# Patient Record
Sex: MALE | Race: OTHER | HISPANIC OR LATINO | URBAN - METROPOLITAN AREA
[De-identification: names, ages, dates, MRNs, and addresses within clinical notes are randomized per-mention and may not be internally consistent; named-entity substitution may affect disease eponyms.]

---

## 2022-01-02 ENCOUNTER — EMERGENCY (EMERGENCY)
Facility: HOSPITAL | Age: 57
LOS: 1 days | Discharge: HOME | End: 2022-01-02
Attending: EMERGENCY MEDICINE
Payer: MEDICAID

## 2022-01-02 VITALS
HEIGHT: 68 IN | RESPIRATION RATE: 16 BRPM | SYSTOLIC BLOOD PRESSURE: 147 MMHG | OXYGEN SATURATION: 98 % | TEMPERATURE: 99 F | DIASTOLIC BLOOD PRESSURE: 85 MMHG | WEIGHT: 160.06 LBS | HEART RATE: 88 BPM

## 2022-01-02 PROCEDURE — 73562 X-RAY EXAM OF KNEE 3: CPT | Mod: 26,LT

## 2022-01-02 PROCEDURE — 99283 EMERGENCY DEPT VISIT LOW MDM: CPT

## 2022-01-02 PROCEDURE — 99283 EMERGENCY DEPT VISIT LOW MDM: CPT | Mod: 25

## 2022-01-02 PROCEDURE — 73562 X-RAY EXAM OF KNEE 3: CPT
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
## 2022-01-02 NOTE — ED PROVIDER NOTE - PROGRESS NOTE DETAILS
Patient with patella boris.   States that this is new, concern for tibial tendon rupture.  Patient told to see ortho tomorrow and told if unable to obtain orthopedist appointment tomorrow (1/3/2022) come back to the Emergency department.   Patient agreeable, patient placed in knee immobilizer and crutches
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
## 2022-01-02 NOTE — ED PROVIDER NOTE - NSFOLLOWUPCLINICS_GEN_ALL_ED_FT
[facility]  Orthopedics  [address] [place] [address]  Phone: [phone]  Fax: [phone]

## 2022-01-02 NOTE — ED ADULT NURSE NOTE - NSIMPLEMENTINTERV_GEN_ALL_ED
Implemented All Universal Safety Interventions:  [place] to call system. Call bell, personal items and telephone within reach. Instruct patient to call for assistance. Room bathroom lighting operational. Non-slip footwear when patient is off stretcher. Physically safe environment: no spills, clutter or unnecessary equipment. Stretcher in lowest position, wheels locked, appropriate side rails in place.

## 2022-01-02 NOTE — ED PROVIDER NOTE - PHYSICAL EXAMINATION
GEN:   comfortable, in no apparent distress, AOx3  EYES:   PERRL, extra-occular movements intact  RESP:   clear to auscultation bilaterally, non-labored, speaking in full sentences  ABD:   soft, non tender, no guarding  :   no cva tenderness  MSK:   5/5 strength, moving all extremities.  no midline spinal TTP, step offs or deformities.  L knee ttp along inferior medial aspect.  No crepitus.  5/5 strength upon flexion and extension, no neurovascular deficits.   SKIN:   dry, intact, no rash  NEURO:   AOx3, no focal weakness or loss of sensation, GCS 15.   PSYCH: calm, cooperative, no apparent risk to self and others GEN:   comfortable, in no apparent distress, AOx3  EYES:   PERRL, extra-occular movements intact  RESP:   clear to auscultation bilaterally, non-labored, speaking in full sentences  ABD:   soft, non tender, no guarding  :   no cva tenderness  MSK:  except left knee 5/5 strength, moving all extremities.  no midline spinal TTP, step offs or deformities.  L knee ttp along inferior medial aspect.  No crepitus.  5/5 strength upon flexion, unable to extend left knee.  Patella boris on exam. no neurovascular deficits.   SKIN:   dry, intact, no rash  NEURO:   AOx3, no focal weakness or loss of sensation, GCS 15.   PSYCH: calm, cooperative, no apparent risk to self and others

## 2022-01-02 NOTE — ED PROVIDER NOTE - NSFOLLOWUPINSTRUCTIONS_ED_ALL_ED_FT
If unable to obtain orthopedist appointment tomorrow (1/3/2022) come back to the Emergency department.

## 2022-01-03 ENCOUNTER — EMERGENCY (EMERGENCY)
Facility: HOSPITAL | Age: 57
LOS: 1 days | Discharge: HOME | End: 2022-01-03
Attending: EMERGENCY MEDICINE
Payer: MEDICAID

## 2022-01-03 VITALS
WEIGHT: 160.06 LBS | OXYGEN SATURATION: 99 % | TEMPERATURE: 98 F | RESPIRATION RATE: 16 BRPM | HEIGHT: 68 IN | SYSTOLIC BLOOD PRESSURE: 125 MMHG | DIASTOLIC BLOOD PRESSURE: 71 MMHG | HEART RATE: 79 BPM

## 2022-01-03 PROCEDURE — 99284 EMERGENCY DEPT VISIT MOD MDM: CPT

## 2022-01-03 NOTE — ED PROVIDER NOTE - ATTENDING CONTRIBUTION TO CARE
55 y/o male presents w/ likely left-sided tendon rupture. Plan: ortho consult and possible admission.
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
## 2022-01-03 NOTE — ED PROVIDER NOTE - NSFOLLOWUPINSTRUCTIONS_ED_ALL_ED_FT
Follow up with Dr Phillips this week without fail.    For pain you can take over the counter Ibuprofen 600 mg orally every 6 hours as needed for pain. Take medication with food.     If you experience any new or worsening symptoms or if you are concerned you can always come back to the emergency for a re-evaluation.

## 2022-01-03 NOTE — ED PROVIDER NOTE - CARE PROVIDER_API CALL
Guru Phillips)  Orthopaedic Surgery; Sports Medicine  [address] [place] [address], [address] [place] [address]  Phone: [phone]  Fax: [phone]  Follow Up Time:
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
## 2022-01-03 NOTE — ED PROVIDER NOTE - PATIENT PORTAL LINK FT
You can access the FollowMyHealth Patient Portal offered by [facility] by registering at the following website: [email or web address]. By joining [employer]’s FollowMyHealth portal, you will also be able to view your health information using other applications (apps) compatible with our system.

## 2022-01-03 NOTE — ED PROVIDER NOTE - PHYSICAL EXAMINATION
Musculoskeletal: swelling w/ deformity and patella boris felt, unable to do straight leg raise or extension of knee, able to dorsiflex and plantarflex the foot, no sensory deficits, distal pulses intact 2+ Musculoskeletal: swelling w/ deformity and patella boris felt, unable to do straight leg raise or extension of knee, able to dorsiflex and plantarflex the foot, no sensory deficits, distal pulses intact 2+.

## 2023-07-25 NOTE — ED PROVIDER NOTE - PROGRESS NOTE DETAILS
fall Ortho consulted. Dr Phillips wants to see patient in office this week for surgery planning. At first MRI requested to be done in the ER but then ortho team decided not to do it today. Knee immobilizer re-applied and will dc with ortho follow up this week. Pt is well appearing, stable for discharge and follow up without fail with medical doctor. I had a detailed discussion with the patient and/or guardian regarding the historical points, exam findings, and any diagnostic results supporting the discharge diagnosis. Pt educated on care and need for follow up. Strict return instructions and red flag signs and symptoms discussed with patient. Questions answered. Pt shows understanding of discharge information and agrees to follow.
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
